# Patient Record
Sex: MALE | Race: OTHER | Employment: FULL TIME | URBAN - METROPOLITAN AREA
[De-identification: names, ages, dates, MRNs, and addresses within clinical notes are randomized per-mention and may not be internally consistent; named-entity substitution may affect disease eponyms.]

---

## 2023-10-09 ENCOUNTER — APPOINTMENT (OUTPATIENT)
Dept: RADIOLOGY | Facility: HOSPITAL | Age: 23
End: 2023-10-09
Payer: COMMERCIAL

## 2023-10-16 ENCOUNTER — HOSPITAL ENCOUNTER (OUTPATIENT)
Dept: RADIOLOGY | Facility: HOSPITAL | Age: 23
Discharge: HOME | End: 2023-10-16
Payer: COMMERCIAL

## 2023-10-16 DIAGNOSIS — G44.209 TENSION-TYPE HEADACHE, UNSPECIFIED, NOT INTRACTABLE: ICD-10-CM

## 2023-10-16 DIAGNOSIS — R51.9 HEADACHE, UNSPECIFIED: ICD-10-CM

## 2023-10-16 DIAGNOSIS — G43.909 MIGRAINE, UNSPECIFIED, NOT INTRACTABLE, WITHOUT STATUS MIGRAINOSUS: ICD-10-CM

## 2023-10-16 PROCEDURE — 70498 CT ANGIOGRAPHY NECK: CPT | Performed by: RADIOLOGY

## 2023-10-16 PROCEDURE — 70498 CT ANGIOGRAPHY NECK: CPT

## 2023-10-16 PROCEDURE — 70496 CT ANGIOGRAPHY HEAD: CPT | Performed by: RADIOLOGY

## 2023-10-16 PROCEDURE — 2550000001 HC RX 255 CONTRASTS: Performed by: PSYCHIATRY & NEUROLOGY

## 2023-10-16 RX ADMIN — IOHEXOL 75 ML: 350 INJECTION, SOLUTION INTRAVENOUS at 16:42

## 2023-10-17 ENCOUNTER — TELEPHONE (OUTPATIENT)
Dept: NEUROLOGY | Facility: HOSPITAL | Age: 23
End: 2023-10-17
Payer: COMMERCIAL

## 2023-10-17 DIAGNOSIS — G43.709 CHRONIC MIGRAINE WITHOUT AURA WITHOUT STATUS MIGRAINOSUS, NOT INTRACTABLE: Primary | ICD-10-CM

## 2023-10-17 NOTE — TELEPHONE ENCOUNTER
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 States he woke up with a headache and took the last of his migraine medication on Saturday.  He is requesting more.  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  ----- Message -----  From: Carito Quigley ATC  Sent: 10/10/2023  10:25 AM EDT  To: Gordon Noyola MD; Kaykay Roman MA  Subject: Ballet dancer imaging denied                     Good morning Dr. 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  He said he received a letter stating that it was due to the location.  He would like to hold off on getting the tests because of this.  He states that the meds that you gave him are helping and that you mentioned following up with a dentist regarding his retainer.  He would like to see the dentist before moving forward with the imaging at this time if you are ok with that plan.  Please let us know either way.

## 2024-02-28 ENCOUNTER — TELEPHONE (OUTPATIENT)
Dept: ORTHOPEDIC SURGERY | Facility: HOSPITAL | Age: 24
End: 2024-02-28
Payer: COMMERCIAL

## 2024-02-28 NOTE — TELEPHONE ENCOUNTER
Received a text from Jose D that he needs a refill for his migraine medicine and also that he needs some blood work after an abnormal finding (ATYA test) when he tried to donate plasma. I told him that we will get him into one of the docs regarding the bloodwork and that I would message Dr. Noyola's office regarding the migraine medication. CT

## 2024-02-29 DIAGNOSIS — G43.009 MIGRAINE WITHOUT AURA AND WITHOUT STATUS MIGRAINOSUS, NOT INTRACTABLE: Primary | ICD-10-CM

## 2024-03-01 RX ORDER — UBROGEPANT 100 MG/1
100 TABLET ORAL SEE ADMIN INSTRUCTIONS
COMMUNITY
Start: 2023-10-18 | End: 2024-03-01 | Stop reason: SDUPTHER

## 2024-03-01 RX ORDER — UBROGEPANT 100 MG/1
100 TABLET ORAL SEE ADMIN INSTRUCTIONS
Qty: 16 TABLET | Refills: 3 | Status: SHIPPED | OUTPATIENT
Start: 2024-03-01 | End: 2024-05-22 | Stop reason: SDUPTHER

## 2024-03-25 ENCOUNTER — APPOINTMENT (OUTPATIENT)
Dept: PRIMARY CARE | Facility: CLINIC | Age: 24
End: 2024-03-25
Payer: COMMERCIAL

## 2024-05-22 ENCOUNTER — TELEPHONE (OUTPATIENT)
Dept: ORTHOPEDIC SURGERY | Facility: HOSPITAL | Age: 24
End: 2024-05-22
Payer: COMMERCIAL

## 2024-05-22 DIAGNOSIS — G43.009 MIGRAINE WITHOUT AURA AND WITHOUT STATUS MIGRAINOSUS, NOT INTRACTABLE: ICD-10-CM

## 2024-05-22 RX ORDER — UBROGEPANT 100 MG/1
100 TABLET ORAL SEE ADMIN INSTRUCTIONS
Qty: 16 TABLET | Refills: 3 | Status: SHIPPED | OUTPATIENT
Start: 2024-05-22

## 2024-05-22 NOTE — TELEPHONE ENCOUNTER
Patient texted me to say that he has run out of his migraine medicine.  I asked if it was the Ubrelvy and he said it was.  I will reach out to Dr. Noyola's office regarding a refill. CT

## 2024-06-10 ENCOUNTER — TELEPHONE (OUTPATIENT)
Dept: ORTHOPEDIC SURGERY | Facility: CLINIC | Age: 24
End: 2024-06-10
Payer: COMMERCIAL

## 2024-06-10 DIAGNOSIS — A59.9 TRICHOMONIASIS: Primary | ICD-10-CM

## 2024-06-10 RX ORDER — METRONIDAZOLE 500 MG/1
2000 TABLET ORAL ONCE
Qty: 4 TABLET | Refills: 0 | Status: SHIPPED | OUTPATIENT
Start: 2024-06-10 | End: 2024-06-10

## 2024-10-02 ENCOUNTER — OFFICE VISIT (OUTPATIENT)
Dept: ORTHOPEDIC SURGERY | Facility: HOSPITAL | Age: 24
End: 2024-10-02
Payer: COMMERCIAL

## 2024-10-02 ENCOUNTER — HOSPITAL ENCOUNTER (OUTPATIENT)
Dept: RADIOLOGY | Facility: HOSPITAL | Age: 24
Discharge: HOME | End: 2024-10-02
Payer: COMMERCIAL

## 2024-10-02 DIAGNOSIS — M79.671 RIGHT FOOT PAIN: ICD-10-CM

## 2024-10-02 DIAGNOSIS — M25.571 ACUTE RIGHT ANKLE PAIN: ICD-10-CM

## 2024-10-02 DIAGNOSIS — S92.354A NONDISPLACED FRACTURE OF FIFTH METATARSAL BONE, RIGHT FOOT, INITIAL ENCOUNTER FOR CLOSED FRACTURE: Primary | ICD-10-CM

## 2024-10-02 PROCEDURE — L4361 PNEUMA/VAC WALK BOOT PRE OTS: HCPCS | Performed by: ORTHOPAEDIC SURGERY

## 2024-10-02 PROCEDURE — 73630 X-RAY EXAM OF FOOT: CPT | Mod: RT

## 2024-10-02 PROCEDURE — 99213 OFFICE O/P EST LOW 20 MIN: CPT | Performed by: ORTHOPAEDIC SURGERY

## 2024-10-02 PROCEDURE — 73610 X-RAY EXAM OF ANKLE: CPT | Mod: RT

## 2024-10-02 ASSESSMENT — PAIN SCALES - GENERAL: PAINLEVEL_OUTOF10: 7

## 2024-10-02 ASSESSMENT — PAIN - FUNCTIONAL ASSESSMENT: PAIN_FUNCTIONAL_ASSESSMENT: 0-10

## 2024-10-02 ASSESSMENT — PAIN DESCRIPTION - DESCRIPTORS: DESCRIPTORS: PRESSURE

## 2024-10-04 ENCOUNTER — OFFICE VISIT (OUTPATIENT)
Dept: ORTHOPEDIC SURGERY | Facility: CLINIC | Age: 24
End: 2024-10-04
Payer: COMMERCIAL

## 2024-10-04 DIAGNOSIS — S92.354A NONDISPLACED FRACTURE OF FIFTH METATARSAL BONE, RIGHT FOOT, INITIAL ENCOUNTER FOR CLOSED FRACTURE: Primary | ICD-10-CM

## 2024-10-04 ASSESSMENT — PAIN DESCRIPTION - DESCRIPTORS: DESCRIPTORS: ACHING

## 2024-10-04 ASSESSMENT — PAIN SCALES - GENERAL: PAINLEVEL_OUTOF10: 6

## 2024-10-04 ASSESSMENT — PAIN - FUNCTIONAL ASSESSMENT: PAIN_FUNCTIONAL_ASSESSMENT: 0-10

## 2024-10-04 NOTE — PROGRESS NOTES
HISTORY OF PRESENT ILLNESS  This is a pleasant 23 y.o. 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 Cheryl MD for evaluation of the right foot pain that has been present over/since  10/2/24 .    How the condition occurred or started: He was dancing rehearsal and twisted his left foot while doing a turn, felt a pop and pain right away  Location of pain (patient points to): lateral 5th MT, ankle does not hurt anymore  Quality of pain: Moderate  Modifying Factors: worse with weightbearing, better with rest  Associated Signs and symptoms: swelling, no numbness or tingling  Previous Treatment: tall cam walker boot, ice, crutches  Hx of prior right ankle sprain in past, recovered without any medical interventions    PHYSICAL EXAMINATION  Constitutional Exam: patient's height and weight reviewed, well-kempt  Psychiatric Exam: alert and oriented x 3, appropriate mood and behavior  Eye Exam: LILI, EOMI  Pulmonary Exam: breathing non-labored, no apparent distress  Lymphatic exam: no appreciable lymphadenopathy in the lower extremities  Cardiovascular exam: DP pulses 2+ bilaterally, PT 2+ bilaterally, toes are pink with good capillary refill, no pitting edema  Skin exam: no open lesions, rashes, abrasions or ulcerations  Neurological exam: sensation to light touch intact in both lower extremities in peripheral and dermatomal distributions (except for any abnormalities noted in musculoskeletal exam)    Musculoskeletal exam: right foot: tender over 5th metatarsal shaft with overlying swelling, no pain over other metatarsals, no pain over lisfranc, negative shuck test, negative anterior drawer and talar tilt test, mild soreness over sinus tarsi, no syndesmotic tenderness, no proximal fibular tenderness, no ankle effusion, no pain over peroneals/posterior tibial tendon/achilles    DATA/RESULTS REVIEW: I personally reviewed the patient's x-ray images and reports  of the the right foot and ankle and show 5th metatarsal shaft and neck fracture minimally displaced only on oblique view and partial fracture (proximal medial cortex intact).  Anatomical alignment of 5th metatarsal fracture on AP and lateral xray views, with some soft tissue swelling.  No other acute findings or fractures.      ASSESSMENT: essentially nondisplaced right 5th metatarsal shaft and neck fracture  PLAN: I discussed with the patient the differential diagnosis, complex traumatic related nature of the condition(s) and available treatment option(s).  Discussed with him that he has very minimal displacement just on oblique x-ray views and the other view showed perfect alignment and barely seen fracture line.  He has less than 2 mm displacement.  This type of fracture can be treated without surgery since it is highly likely that he will get more than 50% of the bone at fracture surface to heal since his fracture fragments are well aligned and he only has a partial fracture with the medial proximal portion of the cortex being intact.  There is a slight risk it may not heal but very low since he is healthy and eats a good diet with vitamin D and calcium.  Surgery unfortunately would not shorten his recovery time and surgery itself has its own risks including incisional healing issues, hardware-related complications, etc.  After discussing the risks and benefits, the patient I selected conservative treatment of this fracture.  We will continue using crutches and tall cam walker boot.  He can meet with his dance physical therapist and start range of motion of the ankle subtalar joints and MTP and IP joints and do hip and core and upper extremity strengthening.  He can do upper body bike for fitness training.  He can do exercises laying down on yoga mat.  We will see him back in the office in 2 to 3 weeks to repeat x-rays of the right foot nonweightbearing out of the boot to make sure no further change in fracture  "alignment.  Discussed with the patient that early fracture healing starts around 3 weeks but takes up to 6 weeks at least to fully heal.  We will have to protect him more the first 3 weeks.  Hopefully if his pain is doing better at that time, we can then progress into working out in the pool or AlterG treadmill and early dance maneuvers in an off weighted condition.  Discussed with him then once we get some bone healing on x-ray we can start doing barre work, gradually working up to center work and then progressing his dance activities as he tolerates once he gets good balance and strength.  Typically this will take 2.5 to 3 months to get back to early level of dancing.  He will continue to get vit d and ca in his diet.  PT order with protocol given to the patient to give to his Dance physical therapist.  The patient's questions were answered in detail.      Note dictated with [employer] software, completed without full type editing to avoid delay.      Dear Referring Physician,  It was my pleasure to see your patient, in the office today.  Please refer to the detailed notes above for my findings and recommendations.  Thank you once again for your consultation request.  If you have any further questions or concerns, please feel free to contact me via email or at the phone number and address below.  Sincerely,    Justine Romero MD   of Orthopedic Surgery, [facility] [employer]  Dual Fellowship-trained in Orthopedic Sports Medicine (Knee and Shoulder), and Foot and Ankle Surgery  The  [facility] [place]   ABOS Subspecialty Certificate in Orthopedic Sports Medicine  Head Team Physician Case \"[place]\" and [facility] \"Storm\"  [employer] OP Physician [id number] Par[employer]ympic Games  Team [employer] US Figure Skating Medical Practitioner, including International Event Coverage  Director, Foot and Ankle Division, Department of " Orthopedics    [facility]  [id number] [place] [address], [place] [id number]  [email or web address]  Office [phone]

## 2024-10-08 NOTE — PROGRESS NOTES
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  Limb is warm and well-perfused some mild ankle soreness as well but good range of motion    X-rays today reveal a dancers fracture of the fifth metatarsal    Plan for him to go into a high tide walking boot to protect the fracture seen of the right foot.  He will follow-up Friday with one of our foot and ankle specialist for definitive treatment

## 2024-10-18 ENCOUNTER — HOSPITAL ENCOUNTER (OUTPATIENT)
Dept: RADIOLOGY | Facility: CLINIC | Age: 24
Discharge: HOME | End: 2024-10-18
Payer: COMMERCIAL

## 2024-10-18 ENCOUNTER — APPOINTMENT (OUTPATIENT)
Dept: ORTHOPEDIC SURGERY | Facility: CLINIC | Age: 24
End: 2024-10-18
Payer: COMMERCIAL

## 2024-10-18 DIAGNOSIS — S92.354A NONDISPLACED FRACTURE OF FIFTH METATARSAL BONE, RIGHT FOOT, INITIAL ENCOUNTER FOR CLOSED FRACTURE: ICD-10-CM

## 2024-10-18 PROCEDURE — 73630 X-RAY EXAM OF FOOT: CPT | Mod: RT

## 2024-10-18 NOTE — PROGRESS NOTES
This is a pleasant 23 y.o. year old male who presents for fuv of right foot.  He is feeling much better, not painful anymore just feels pressure at times.  Bearing weight on right foot in boot and using crutches.  Interventions: PT starting Monday with [facility], doing the exercises that we showed him    PHYSICAL EXAMINATION  Constitutional Exam: patient's height and weight reviewed, well-kempt  Psychiatric Exam: alert and oriented x 3, appropriate mood and behavior  Eye Exam: LILI, EOMI  Pulmonary Exam: breathing non-labored, no apparent distress  Lymphatic exam: no appreciable lymphadenopathy in the lower extremities  Cardiovascular exam: DP pulses 2+ bilaterally, PT 2+ bilaterally, toes are pink with good capillary refill, no pitting edema  Skin exam: no open lesions, rashes, abrasions or ulcerations  Neurological exam: sensation to light touch intact in both lower extremities in peripheral and dermatomal distributions (except for any abnormalities noted in musculoskeletal exam)    Musculoskeletal exam: right foot: no significant pain on palpation of 5th MT, less swelling, good ROM of ankle and ST joint and MTP joints    DATA/RESULTS REVIEW: I personally reviewed the patient's x-ray images and reports of the right foot showing no further displacement of the 5th MT fx.     ASSESSMENT: right 5th metatarsal shaft and neck fracture in appropriate position.  We can continue with conservative management since patient's clinical course is improving  PLAN: Further treatment options discussed including can start WBAT in boot with 2 crutches, Can get carbon fiber plate and place in athletic shoe so that he can do exercise bike and Alter G treadmill in PT.  PT orders updated also to include BFR.  Wbat in boot without crutches starting 10/25/24.  FUV in a few weeks for xrays ap/lat/obl right foot to assess healing.  Continue cardio, core, upper body weight training.  The patient's questions were answered in detail.      Note  dictated with [employer] software, completed without full type editing to avoid delay.

## 2024-10-21 ENCOUNTER — EVALUATION (OUTPATIENT)
Dept: PHYSICAL THERAPY | Facility: CLINIC | Age: 24
End: 2024-10-21
Payer: COMMERCIAL

## 2024-10-21 DIAGNOSIS — S92.354A NONDISPLACED FRACTURE OF FIFTH METATARSAL BONE, RIGHT FOOT, INITIAL ENCOUNTER FOR CLOSED FRACTURE: ICD-10-CM

## 2024-10-21 DIAGNOSIS — M79.671 RIGHT FOOT PAIN: ICD-10-CM

## 2024-10-21 DIAGNOSIS — S92.354D NONDISPLACED FRACTURE OF FIFTH RIGHT METATARSAL BONE WITH ROUTINE HEALING: Primary | ICD-10-CM

## 2024-10-21 PROCEDURE — 97140 MANUAL THERAPY 1/> REGIONS: CPT | Mod: GP

## 2024-10-21 PROCEDURE — 97161 PT EVAL LOW COMPLEX 20 MIN: CPT | Mod: GP

## 2024-10-21 ASSESSMENT — PAIN SCALES - GENERAL: PAINLEVEL_OUTOF10: 0 - NO PAIN

## 2024-10-21 NOTE — PROGRESS NOTES
Physical Therapy  Physical Therapy Orthopedic Evaluation    Patient Name: Jose D Montalvo  MRN: [id number]  Encounter Date: 10/21/2024  Time Calculation  Start Time: 1108  Stop Time: 1150  Time Calculation (min): 42 min    Insurance:  Visit number: 1  Approved number of visits:  ?  Insurance:  workman's comp      General:  Reason for visit: 5th met fx  Referred by: tha    Current Problem  1. Nondisplaced fracture of fifth right metatarsal bone with routine healing        2. Right foot pain  Follow Up In Physical Therapy            General  Reason for Referral: 5th met fx  Referred By: tha  Precautions  Precautions  Precautions Comment: none  Pain  0-10 (Numeric) Pain Score: 0 - No pain      Subjective:     Chief Complaint: L met fx 5th     Fx occurred after rolling ankle while dancing.  [place] pop and unable to Wbing following injury.  Went to MD later that day and put in boot.     Been NWBing in boot since injury  Met with MD and stated this Friday can walk without crutches    3 weeks gets repeat   Onset: 10/2/24  DONALD:  dancing    Current Condition:    better     PAIN  increases pain/difficulty:  walking pointing foot, standing   decrease pain:  icing, resting     Intensity (0-10): current 0, on a bad day 0, on a good day 0  Location: dorsum of foot  Description: achy     Relevant Information (PMH & Previous Tests/Imaging): xrays  Previous Interventions/Treatments: none    Current level of function/exercise: none  Prior Level of Function (PLOF)  Patient previously independent with all ADLs  Exercise/Physical Activity: dancer  Work/School: full time      Work status: off due to recent injury    Living situation   - reviewed and no concern  Personal factors Impacting care:   - language: ENG    Pt stated goal(s) back to dance    Medical History Form: Reviewed (scanned into chart)    Red Flags: Do you have any of the following? none  Fever/chills, unexplained weight changes, dizziness/fainting, unexplained  change in bowel or bladder functions, unexplained malaise or muscle weakness, night pain/sweats, numbness or tingling    Problem list:  Pain, Impaired balance, Impaired gait, Decreased flexibility, Decreased strength, Limitations to normal ADL's, and Decreased knowledge of HEP     Objective:    Ankle ROM  DF 20/15  PF 73/70  IV 20/25  EV 12/13    Ankle MMT  DF 5/5  PF 5/5  IV 5/5  EV 5/5    Ttp ant tib, lateral ankle ligs, intrinsics  Mild bruising plantar aspect of 5th and 4th ray     Amb in boot TTWB with crutches       Outcome Measures:  Other Measures  Lower Extremity Funtional Score (LEFS): 26     EDUCATION: home exercise program, plan of care, activity modifications, pain management, and injury pathology      HEP and treatment: Performed and provided:    Ankle T Band 4 way  AP to TC joint   STM to intrinsics plantar fascia, ant tib and toe extensors, and lateral and medial ankle R    Charges: eval x 1 man x 1  Level of Eval Complexity:  low  Clinical Presentation:   Stable and/or uncomplicated characteristics,         Assessment: Patient is a 22 yo m with c/o R foot pain s/p 5th met non-displaced fracture.   Pt presents with signs and symptoms consistent with symptoms, resulting in limited participation in pain-free ADLs and inability to perform at their prior level of function. Pt would benefit from physical therapy to address the impairments found & listed previously in the objective section in order to return to safe and pain-free ADLs and prior level of function.    Prognosis: Good    Plan:     Planned Interventions include: therapeutic exercise, self-care home management, manual therapy, therapeutic activities, gait training, neuromuscular coordination, aquatic therapy  Frequency: 2  Duration: 6-8 weeks    Goals:  Active       PT Problem       STG       Start:  10/21/24    Expected End:  11/02/24       Independent HEP by 2 weeks         LTG       Start:  10/21/24    Expected End:  12/20/24       Full ROM  ankle without pain by 2-4 weeks  Walk without boot without pain by 4-6 weeks  Resume dancing by 6 weeks  Able to jump demo good form to return to dance safely by 6-8 weeks  LEFS improved by 40 points by 6-8 weeks             Plan of Care was developed with input and agreement by the patient.

## 2024-10-28 ENCOUNTER — TREATMENT (OUTPATIENT)
Dept: PHYSICAL THERAPY | Facility: CLINIC | Age: 24
End: 2024-10-28
Payer: COMMERCIAL

## 2024-10-28 DIAGNOSIS — M79.671 RIGHT FOOT PAIN: ICD-10-CM

## 2024-10-28 DIAGNOSIS — S92.354D NONDISPLACED FRACTURE OF FIFTH RIGHT METATARSAL BONE WITH ROUTINE HEALING: Primary | ICD-10-CM

## 2024-10-28 PROCEDURE — 97110 THERAPEUTIC EXERCISES: CPT | Mod: GP

## 2024-10-28 PROCEDURE — 97140 MANUAL THERAPY 1/> REGIONS: CPT | Mod: GP

## 2024-10-28 ASSESSMENT — PAIN SCALES - GENERAL: PAINLEVEL_OUTOF10: 0 - NO PAIN

## 2024-11-04 ENCOUNTER — TREATMENT (OUTPATIENT)
Dept: PHYSICAL THERAPY | Facility: CLINIC | Age: 24
End: 2024-11-04
Payer: COMMERCIAL

## 2024-11-04 DIAGNOSIS — S92.354D NONDISPLACED FRACTURE OF FIFTH RIGHT METATARSAL BONE WITH ROUTINE HEALING: Primary | ICD-10-CM

## 2024-11-04 DIAGNOSIS — M79.671 RIGHT FOOT PAIN: ICD-10-CM

## 2024-11-04 PROCEDURE — 97110 THERAPEUTIC EXERCISES: CPT | Mod: GP

## 2024-11-04 ASSESSMENT — PAIN SCALES - GENERAL: PAINLEVEL_OUTOF10: 0 - NO PAIN

## 2024-11-04 NOTE — PROGRESS NOTES
Physical Therapy Treatment    Patient Name: Jose D Montalvo  MRN: [id number]  Encounter Date: 11/4/2024  Time Calculation  Start Time: 1145  Stop Time: 1230  Time Calculation (min): 45 min    Insurance:   Visit number: 3  Approved number of visits:  12  12/17/24  Insurance:  workman's comp      Current Problem  1. Nondisplaced fracture of fifth right metatarsal bone with routine healing        2. Right foot pain  Follow Up In Physical Therapy          General  Reason for Referral: 5th met fx  Referred By: tha  Precautions  Precautions  Precautions Comment: none  Pain  0-10 (Numeric) Pain Score: 0 - No pain    Subjective:     Patient reports foot doing good no pain unless pressing on the part that hurts    Compliant with HEP? yes    Objective:     Amb in boot    Treatments:     Prostretch  [place]  R 4 x  Seated Calf raise 40#  15 x 3   Weight board 25# 2 min PF/DF EV/IV R  PF stretch 1 min x 2   Leg press Right only 90# 25x , 130# 20 x R only; 230# 20x     Charges: TE x 3    Assessment: improve stability, still no pain with exercises.      Plan: to bring shoe next visit

## 2024-11-08 ENCOUNTER — APPOINTMENT (OUTPATIENT)
Dept: ORTHOPEDIC SURGERY | Facility: CLINIC | Age: 24
End: 2024-11-08
Payer: COMMERCIAL

## 2024-11-08 ENCOUNTER — HOSPITAL ENCOUNTER (OUTPATIENT)
Dept: RADIOLOGY | Facility: CLINIC | Age: 24
Discharge: HOME | End: 2024-11-08
Payer: COMMERCIAL

## 2024-11-08 DIAGNOSIS — S92.354D NONDISPLACED FRACTURE OF FIFTH METATARSAL BONE, RIGHT FOOT, SUBSEQUENT ENCOUNTER FOR FRACTURE WITH ROUTINE HEALING: Primary | ICD-10-CM

## 2024-11-08 DIAGNOSIS — S92.354A NONDISPLACED FRACTURE OF FIFTH METATARSAL BONE, RIGHT FOOT, INITIAL ENCOUNTER FOR CLOSED FRACTURE: ICD-10-CM

## 2024-11-08 PROCEDURE — 73630 X-RAY EXAM OF FOOT: CPT | Mod: RT

## 2024-11-08 NOTE — PROGRESS NOTES
This is a pleasant 24 y.o. year old male who presents for fuv of right foot.  He is using boot and taking boot off for PT, no pain anymore over his 5th metatarsal or when places right foot on ground.  He is trying to keep up his fitness.      PHYSICAL EXAMINATION  Constitutional Exam: patient's height and weight reviewed, well-kempt  Psychiatric Exam: alert and oriented x 3, appropriate mood and behavior  Eye Exam: LILI, EOMI  Pulmonary Exam: breathing non-labored, no apparent distress  Lymphatic exam: no appreciable lymphadenopathy in the lower extremities  Cardiovascular exam: DP pulses 2+ bilaterally, PT 2+ bilaterally, toes are pink with good capillary refill, no pitting edema  Skin exam: no open lesions, rashes, abrasions or ulcerations  Neurological exam: sensation to light touch intact in both lower extremities in peripheral and dermatomal distributions (except for any abnormalities noted in musculoskeletal exam)    Musculoskeletal exam: right foot: no pain on palpation of 5th metatarsal, full ROM of ankle/ST/MTP/IP joints, motor intact 5/5 for DF/PF/INV/EV, minimal swelling    DATA/RESULTS REVIEW: I personally reviewed the patient's x-ray images and reports of the right foot show nicely healing 5th metatarsal fracture with abundant new bone formation and trabeculation across majority of fracture site,, as compared to original films.     ASSESSMENT: right 5th metatarsal fracture neck and shaft with routine healing  PLAN: Further treatment options discussed including continue PT, updated orders.  He can wean out of boot into athletic shoe with carbon fiber plate under shoe's insert; use carbon fiber plate for 2 weeks.  He can progress to barre work this week, center work next week, and then slowly advance to jumping or more agility maneuvers the following week as tolerated.  FUV in 3-4 weeks for final clearance for progressing to full participation in rehearsals and performances.  Final xrays AP/lat/obl right  foot at next visit.  The patient's questions were answered in detail.      Note dictated with [employer] software, completed without full type editing to avoid delay.

## 2024-11-08 NOTE — LETTER
November 8, 2024     Patient: Jose D Montalvo   YOB: 2000   Date of Visit: 11/8/2024       To Whom It May Concern:    It is my medical opinion that Jose D Montalvo  can return to barre work on 11/11/24   He will continue working with his physical therapist.  He can use the boot for longer periods of standing or walking to doe choreography.  He can start center work on 11/18/24.  Estimated return to jumping and more difficult maneuvers starting on 11/25/2024.  He may need to take a break intermittently during rehearsals or dance classes as tolerated over the next 6 weeks until appropriate foot and leg endurance is achieved  .    If you have any questions or concerns, please don't hesitate to call.         Sincerely,        Justine Romero MD    CC: No Recipients

## 2024-11-11 ENCOUNTER — TREATMENT (OUTPATIENT)
Dept: PHYSICAL THERAPY | Facility: CLINIC | Age: 24
End: 2024-11-11
Payer: COMMERCIAL

## 2024-11-11 DIAGNOSIS — M79.671 RIGHT FOOT PAIN: ICD-10-CM

## 2024-11-11 PROCEDURE — 97112 NEUROMUSCULAR REEDUCATION: CPT | Mod: GP

## 2024-11-11 PROCEDURE — 97110 THERAPEUTIC EXERCISES: CPT | Mod: GP

## 2024-11-11 ASSESSMENT — PAIN SCALES - GENERAL: PAINLEVEL_OUTOF10: 0 - NO PAIN

## 2024-11-11 NOTE — PROGRESS NOTES
Physical Therapy Treatment    Patient Name: Jose D Montalvo  MRN: [id number]  Encounter Date: 11/11/2024  Time Calculation  Start Time: 1103  Stop Time: 1148  Time Calculation (min): 45 min    Insurance:     Visit number: 4  Approved number of visits:  12 12/17/24  Insurance:  workman's comp  Current Problem  1. Right foot pain  Follow Up In Physical Therapy          General  Reason for Referral: 5th met fx  Referred By: tha  Precautions  Precautions  Precautions Comment: none  Pain  0-10 (Numeric) Pain Score: 0 - No pain    Subjective:     Patient reports able to wean out of boot, tried on Saturday forgot boot while at work but was really in pain.  Iced and felt better.  Yesterday was just sore.  Today no pain just uncomfortable.      Compliant with HEP? B    Objective:   Lacking push off in gait with shoe on      Treatments:     Bike 5 min  Incline stretch 1 min B  Calf raise 20 x 3 way   AP to TC joint with DF R  X walks blue belt 20' x 2   Steamboats 7.5# 15 x 4 way B  RDL 8 kg 10 x 2 B  Tandem stance kb pass cw & ccw 20 x R/L, L/R  Leg press 170# 25 x, 130# R only 20 x, 230# 20x   1/2 foam Tandem balance rebounder toss 25 x R/L, L/R    Charges: te x 2 nm x 1    Assessment: challenged with balance, no pain during treatment.  Ok to start class tomorrow limit releve if reproduces pain.        Plan: single leg sit-stand, retro resisted step up

## 2024-11-18 ENCOUNTER — TREATMENT (OUTPATIENT)
Dept: PHYSICAL THERAPY | Facility: CLINIC | Age: 24
End: 2024-11-18
Payer: COMMERCIAL

## 2024-11-18 DIAGNOSIS — M79.671 RIGHT FOOT PAIN: ICD-10-CM

## 2024-11-18 DIAGNOSIS — S92.354D NONDISPLACED FRACTURE OF FIFTH RIGHT METATARSAL BONE WITH ROUTINE HEALING: Primary | ICD-10-CM

## 2024-11-18 PROCEDURE — 97112 NEUROMUSCULAR REEDUCATION: CPT | Mod: GP

## 2024-11-18 PROCEDURE — 97110 THERAPEUTIC EXERCISES: CPT | Mod: GP

## 2024-11-18 ASSESSMENT — PAIN SCALES - GENERAL: PAINLEVEL_OUTOF10: 0 - NO PAIN

## 2024-11-18 NOTE — PROGRESS NOTES
Physical Therapy Treatment    Patient Name: Jose D Montalvo  MRN: [id number]  Encounter Date: 11/18/2024  Time Calculation  Start Time: 1105  Stop Time: 1150  Time Calculation (min): 45 min    Insurance:   Visit number: 5  Approved number of visits:  12  12/17/24  Insurance:  workman's comp      Current Problem  1. Nondisplaced fracture of fifth right metatarsal bone with routine healing        2. Right foot pain  Follow Up In Physical Therapy          General  Reason for Referral: 5th met fx  Referred By: tha  Precautions  Precautions  Precautions Comment: none  Pain  0-10 (Numeric) Pain Score: 0 - No pain    Subjective:     Patient reports foot is painful with tight shoes on so has been wearing slippers and not wearing shoes all the time.  Notes walking better but has extreme fatigue in the foot with prolonged standing.  No pain any more just sore.     Compliant with HEP? yes    Objective:   Normal gait      Treatments:   Elliptical 5 min  Calf raise on step 4 inch   Calf raise ball squeeze 20 x  Releve squat ball squeeze 20 x  Squat calf raise 20 x  Resisted walk sports cord 4 way 10 x   Retro step up black sports cord 10 x 3 B box 2  Bosu step up 20 x  Bosu squats 2 way 20 x   Inverted bosu SLS 1 min R  A/P lunges 10# 10 x B  Leg press 230# 20x, 130# 10x, 2 legs 270#   Pf stretch 1 min x 1 R      Charges: te x 2 nm x 1    Assessment: fatigued after treatment       Plan: general strengthening and balance lunge split squat

## 2024-11-27 ENCOUNTER — TREATMENT (OUTPATIENT)
Dept: PHYSICAL THERAPY | Facility: CLINIC | Age: 24
End: 2024-11-27
Payer: COMMERCIAL

## 2024-11-27 DIAGNOSIS — S92.354D NONDISPLACED FRACTURE OF FIFTH RIGHT METATARSAL BONE WITH ROUTINE HEALING: Primary | ICD-10-CM

## 2024-11-27 DIAGNOSIS — M79.671 RIGHT FOOT PAIN: ICD-10-CM

## 2024-11-27 PROCEDURE — 97140 MANUAL THERAPY 1/> REGIONS: CPT | Mod: GP

## 2024-11-27 PROCEDURE — 97112 NEUROMUSCULAR REEDUCATION: CPT | Mod: GP

## 2024-11-27 ASSESSMENT — PAIN SCALES - GENERAL: PAINLEVEL_OUTOF10: 0 - NO PAIN

## 2024-11-27 NOTE — PROGRESS NOTES
Physical Therapy Treatment    Patient Name: Jose D Montalvo  MRN: [id number]  Encounter Date: 11/27/2024  Time Calculation  Start Time: 1152  Stop Time: 1234  Time Calculation (min): 42 min    Insurance:   Visit number: 6  Approved number of visits:  12  12/17/24  Insurance:  workman's comp      Current Problem  1. Nondisplaced fracture of fifth right metatarsal bone with routine healing        2. Right foot pain  Follow Up In Physical Therapy          General  Reason for Referral: 5th met fx  Referred By: tha  Precautions  Precautions  Precautions Comment: none  Pain  0-10 (Numeric) Pain Score: 0 - No pain    Subjective:     Patient reports toes feeling better.  Has been taking class trying some jumps.  Ankle feels jammed and also R hip.  R hip pain starts in front and wraps around to the back and started when he started jumping.      Compliant with HEP?  yes    Objective:     Hypomobile R ankle and Rhip with ttp thorugh hip flexors adductors and TFL/     Treatments:   Elliptical 5 min  Ankle a/p mob with long axis distraction   R hip mobs: caudal, lateral and A/P  [place] to hip flexors, STM to hip flexors TFL  Box 3 landing 2 foot 15 x  1/4 turn box jump 5 x B  Land 1 foot R from [place] 15x      Charges: man x 2 nm x 1    Assessment: improved hip and ankle mobility post manual treatment.  No pain with jumping and impact but encouraged to go light on the jumping and increase as able.        Plan: jump, land 1 foot turning jump

## 2024-12-04 ENCOUNTER — APPOINTMENT (OUTPATIENT)
Dept: PHYSICAL THERAPY | Facility: CLINIC | Age: 24
End: 2024-12-04
Payer: COMMERCIAL

## 2024-12-04 DIAGNOSIS — S92.354D NONDISPLACED FRACTURE OF FIFTH RIGHT METATARSAL BONE WITH ROUTINE HEALING: Primary | ICD-10-CM

## 2024-12-06 ENCOUNTER — APPOINTMENT (OUTPATIENT)
Dept: ORTHOPEDIC SURGERY | Facility: CLINIC | Age: 24
End: 2024-12-06
Payer: COMMERCIAL

## 2024-12-06 ENCOUNTER — HOSPITAL ENCOUNTER (OUTPATIENT)
Dept: RADIOLOGY | Facility: CLINIC | Age: 24
Discharge: HOME | End: 2024-12-06
Payer: COMMERCIAL

## 2024-12-06 DIAGNOSIS — S92.354A NONDISPLACED FRACTURE OF FIFTH METATARSAL BONE, RIGHT FOOT, INITIAL ENCOUNTER FOR CLOSED FRACTURE: ICD-10-CM

## 2024-12-06 DIAGNOSIS — S92.354D CLOSED NONDISPLACED FRACTURE OF FIFTH METATARSAL BONE OF RIGHT FOOT WITH ROUTINE HEALING, SUBSEQUENT ENCOUNTER: Primary | ICD-10-CM

## 2024-12-06 PROCEDURE — 73630 X-RAY EXAM OF FOOT: CPT | Mod: RT

## 2024-12-06 NOTE — PROGRESS NOTES
This is a pleasant 24 y.o. year old male who presents for fuv of right foot.  He reports that he is doing very well, no pain. Feels tight at times.  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  He is working on returning to his more complicated jumps.  No instability.  Feels that strength as improved.      PHYSICAL EXAMINATION  Constitutional Exam: patient's height and weight reviewed, well-kempt  Psychiatric Exam: alert and oriented x 3, appropriate mood and behavior  Eye Exam: LILI, EOMI  Pulmonary Exam: breathing non-labored, no apparent distress  Lymphatic exam: no appreciable lymphadenopathy in the lower extremities  Cardiovascular exam: DP pulses 2+ bilaterally, PT 2+ bilaterally, toes are pink with good capillary refill, no pitting edema  Skin exam: no open lesions, rashes, abrasions or ulcerations  Neurological exam: sensation to light touch intact in both lower extremities in peripheral and dermatomal distributions (except for any abnormalities noted in musculoskeletal exam)    Musculoskeletal exam: right foot: no pain on palpation over 5th metatarsal, full ankle and ST ROM, good calf tone, able to do single leg balance and squat with good form    DATA/RESULTS REVIEW: I personally reviewed the patient's x-ray images and reports of the right foot showing healed 5th metatarsal fracture in appropriate alignment .     ASSESSMENT: healed right 5th metatarsal shaft and neck fracture  PLAN: Further treatment options discussed including continue progression to advanced jumping as tolerated.  He has no restrictions at work.  FUV if he has any concerns.  The patient's questions were answered in detail.      Note dictated with statusboom software, completed without full type editing to avoid delay.

## 2024-12-09 ENCOUNTER — DOCUMENTATION (OUTPATIENT)
Dept: PHYSICAL THERAPY | Facility: CLINIC | Age: 24
End: 2024-12-09
Payer: COMMERCIAL

## 2024-12-09 DIAGNOSIS — S92.354D NONDISPLACED FRACTURE OF FIFTH RIGHT METATARSAL BONE WITH ROUTINE HEALING: Primary | ICD-10-CM

## 2025-05-13 DIAGNOSIS — G43.009 MIGRAINE WITHOUT AURA AND WITHOUT STATUS MIGRAINOSUS, NOT INTRACTABLE: ICD-10-CM

## 2025-05-20 ENCOUNTER — APPOINTMENT (OUTPATIENT)
Dept: NEUROLOGY | Facility: CLINIC | Age: 25
End: 2025-05-20
Payer: COMMERCIAL

## 2025-05-20 VITALS
RESPIRATION RATE: 18 BRPM | HEART RATE: 83 BPM | BODY MASS INDEX: 25.98 KG/M2 | HEIGHT: 68 IN | SYSTOLIC BLOOD PRESSURE: 124 MMHG | WEIGHT: 171.4 LBS | DIASTOLIC BLOOD PRESSURE: 76 MMHG

## 2025-05-20 DIAGNOSIS — G43.009 MIGRAINE WITHOUT AURA AND WITHOUT STATUS MIGRAINOSUS, NOT INTRACTABLE: Primary | ICD-10-CM

## 2025-05-20 DIAGNOSIS — F45.8 BRUXISM: ICD-10-CM

## 2025-05-20 DIAGNOSIS — G44.229 CHRONIC TENSION-TYPE HEADACHE, NOT INTRACTABLE: ICD-10-CM

## 2025-05-20 PROCEDURE — 99215 OFFICE O/P EST HI 40 MIN: CPT | Performed by: PSYCHIATRY & NEUROLOGY

## 2025-05-20 PROCEDURE — 3008F BODY MASS INDEX DOCD: CPT | Performed by: PSYCHIATRY & NEUROLOGY

## 2025-05-20 RX ORDER — SUMATRIPTAN SUCCINATE 50 MG/1
50 TABLET ORAL ONCE AS NEEDED
Qty: 9 TABLET | Refills: 5 | Status: SHIPPED | OUTPATIENT
Start: 2025-05-20 | End: 2025-05-21 | Stop reason: SDUPTHER

## 2025-05-20 ASSESSMENT — PAIN SCALES - GENERAL: PAINLEVEL_OUTOF10: 2

## 2025-05-20 ASSESSMENT — PATIENT HEALTH QUESTIONNAIRE - PHQ9
2. FEELING DOWN, DEPRESSED OR HOPELESS: NOT AT ALL
1. LITTLE INTEREST OR PLEASURE IN DOING THINGS: NOT AT ALL
SUM OF ALL RESPONSES TO PHQ9 QUESTIONS 1 AND 2: 0

## 2025-05-20 NOTE — PROGRESS NOTES
Kassandra Montalvo 24 y.o.  HPI  The patient states that he typically has some vision changes prior to the onset of his headaches.  He states that his headaches are sharp and involve his entire head.  The patient denies any nausea, vomiting or phonophobia but does have photophobia.  He states that after I saw him in 2023 he was having about 3-4 headaches per week.  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  He will also occasionally have a migraine that will wake him up from sleep.  He was having some bruxism and this was treated with a mouthguard and he states that his headaches decreased to approximately once a week.  Recently the patient has noted an increase in his headache frequency and he is having them 3 times per week.  The patient states that when he took Qulipta his headache frequency was significantly improved and insurance would not approve it.  Ubrelvy was not approved either.    The patient had a CT angiogram of the neck and brain back in 2023 that was ordered by me.  There is no evidence to suggest any intra or extracranial artery stenosis.  There is no intracranial aneurysm or AVM noted.    Review of Systems   All other systems reviewed and are negative.       Problem List[1]     Medical History[2]     Surgical History[3]     Social History     Socioeconomic History    Marital status: Single     Spouse name: Not on file    Number of children: Not on file    Years of education: Not on file    Highest education level: Not on file   Occupational History    Not on file   Tobacco Use    Smoking status: Some Days     Types: Cigarettes    Smokeless tobacco: Never   Vaping Use    Vaping status: Some Days    Substances: Nicotine   Substance and Sexual Activity    Alcohol use: Yes     Alcohol/week: 2.0 standard drinks of alcohol     Types: 2 Cans of beer per week    Drug use: Never    Sexual activity: Defer   Other Topics Concern    Not on file   Social History  "Narrative    Not on file     Social Drivers of Health     Financial Resource Strain: Not on file   Food Insecurity: Not on file   Transportation Needs: Not on file   Physical Activity: Not on file   Stress: Not on file   Social Connections: Not on file   Intimate Partner Violence: Not on file   Housing Stability: Not on file        Family History[4]     Current Outpatient Medications   Medication Instructions    ubrogepant (UBRELVY) 100 mg, oral, See admin instructions, Take 100 mg at onset of migraine, if needed a second dose of 100 mg can be taken at least 2 hrs after initial dose with max dose of 200 mg in 24 hr period        RX Allergies[5]     Objective  /76   Pulse 83   Resp 18   Ht 1.727 m (5' 8\")   Wt 77.7 kg (171 lb 6.4 oz)   BMI 26.06 kg/m²    GENERAL APPEARANCE:  No distress, alert and cooperative.     MENTAL STATE:  Orientation was normal to time, place and person. Recent and remote memory was intact.  Attention span and concentration were normal. Language testing was normal for comprehension, repetition, expression, and naming. Calculation was intact. The patient could correctly interpret a picture, and copy a diagram. General fund of knowledge was intact.     CRANIAL NERVES:  Cranial nerves were normal.      CN 2- Visual Acuity  OD: 20/20 (corrected)   OS: 20/20 (corrected); visual fields full to confrontation.      CN 3, 4, 6-  Pupils round, 4 mm in diameter, equally reactive to light. No ptosis. EOMs normal alignment, full range of movement, no nystagmus     CN 5- Facial sensation intact bilaterally. Normal corneal reflexes.      CN 7- Normal and symmetric facial strength. Nasolabial folds symmetric.     CN 8- Hearing intact to finger rub, whisper.      CN 9- Palate elevates symmetrically. Normal gag reflex.      CN 11- Normal strength of shoulder shrug and neck turning      CN 12- Tongue midline, with normal bulk and strength; no fasciculations.     MOTOR:  Motor exam was normal. Muscle " bulk and tone were normal in both upper and lower extremities. Muscle strength was 5/5 in distal and proximal muscles in both upper and lower extremities. No fasciculations, tremor or other abnormal movements were present.     GAIT: Station was stable with a normal base and negative Romberg sign. Gait was stable with a normal arm swing and speed. No ataxia, shuffling, steppage or waddling was noted. Tandem gait was intact. Postural reflexes were normal.     Assessment/Plan   The patient has had a worsening of his headache frequency.  I will start him on Emgality 240 mg subcu the first month and then 120 mg monthly after that.  I did warn him of the possibility of site reactions with this medicine.  The patient does need sumatriptan 50 mg tabs to be taken at headache onset.  The patient can repeat the dose in 2 hours if there is no relief of his headaches.  I did warn him of the possibility of a flushing sensation with this medicine.  The patient should continue to use his mouthguard for his bruxism.  I would like the patient to call me to let me know how he is doing after he has been on the Emgality for 2 months.  I discussed all these issues in detail with the patient and answered all of his questions.  The patient will follow-up with the NP in 6 months.       [1]   Patient Active Problem List  Diagnosis    Nondisplaced fracture of fifth right metatarsal bone with routine healing    Right foot pain   [2]   Past Medical History:  Diagnosis Date    Other specified health status     No pertinent past medical history   [3]   Past Surgical History:  Procedure Laterality Date    ANTERIOR CRUCIATE LIGAMENT REPAIR      OTHER SURGICAL HISTORY  11/17/2021    No history of surgery   [4] No family history on file.  [5] No Known Allergies

## 2025-05-20 NOTE — PATIENT INSTRUCTIONS
The patient has had a worsening of his headache frequency.  I will start him on Emgality 240 mg subcu the first month and then 120 mg monthly after that.  I did warn him of the possibility of site reactions with this medicine.  The patient does need sumatriptan 50 mg tabs to be taken at headache onset.  The patient can repeat the dose in 2 hours if there is no relief of his headaches.  I did warn him of the possibility of a flushing sensation with this medicine.  The patient should continue to use his mouthguard for his bruxism.  I would like the patient to call me to let me know how he is doing after he has been on the Emgality for 2 months.  I discussed all these issues in detail with the patient and answered all of his questions.  The patient will follow-up with the NP in 6 months.

## 2025-05-21 DIAGNOSIS — G43.009 MIGRAINE WITHOUT AURA AND WITHOUT STATUS MIGRAINOSUS, NOT INTRACTABLE: ICD-10-CM

## 2025-05-21 RX ORDER — SUMATRIPTAN SUCCINATE 50 MG/1
50 TABLET ORAL ONCE AS NEEDED
Qty: 9 TABLET | Refills: 0 | Status: SHIPPED | OUTPATIENT
Start: 2025-05-21

## 2025-06-09 ENCOUNTER — APPOINTMENT (OUTPATIENT)
Dept: NEUROLOGY | Facility: CLINIC | Age: 25
End: 2025-06-09
Payer: COMMERCIAL

## 2025-06-23 DIAGNOSIS — G43.009 MIGRAINE WITHOUT AURA AND WITHOUT STATUS MIGRAINOSUS, NOT INTRACTABLE: ICD-10-CM

## 2025-06-23 RX ORDER — SUMATRIPTAN SUCCINATE 50 MG/1
TABLET ORAL
Qty: 9 TABLET | Refills: 0 | Status: SHIPPED | OUTPATIENT
Start: 2025-06-23